# Patient Record
Sex: MALE | Race: OTHER | NOT HISPANIC OR LATINO | Employment: UNEMPLOYED | URBAN - METROPOLITAN AREA
[De-identification: names, ages, dates, MRNs, and addresses within clinical notes are randomized per-mention and may not be internally consistent; named-entity substitution may affect disease eponyms.]

---

## 2023-02-09 ENCOUNTER — APPOINTMENT (EMERGENCY)
Dept: RADIOLOGY | Facility: HOSPITAL | Age: 58
End: 2023-02-09

## 2023-02-09 ENCOUNTER — HOSPITAL ENCOUNTER (EMERGENCY)
Facility: HOSPITAL | Age: 58
Discharge: HOME | End: 2023-02-09
Attending: EMERGENCY MEDICINE

## 2023-02-09 VITALS
TEMPERATURE: 99.1 F | OXYGEN SATURATION: 98 % | WEIGHT: 184.3 LBS | HEART RATE: 122 BPM | DIASTOLIC BLOOD PRESSURE: 107 MMHG | RESPIRATION RATE: 18 BRPM | SYSTOLIC BLOOD PRESSURE: 157 MMHG

## 2023-02-09 DIAGNOSIS — M19.90 ARTHRITIS: Primary | ICD-10-CM

## 2023-02-09 RX ORDER — ACETAMINOPHEN 325 MG/1
975 TABLET ORAL ONCE
Status: COMPLETED | OUTPATIENT
Start: 2023-02-09 | End: 2023-02-09

## 2023-02-09 RX ADMIN — ACETAMINOPHEN 975 MG: 325 TABLET ORAL at 20:39

## 2023-02-10 NOTE — ED PROVIDER NOTES
History  Chief Complaint   Patient presents with   • Knee Pain     Pt came to ER with left hip and knee pain for ten years  Pt is staying at the [address] [place] [address]  Patient is a 71-year-old male who [facility] via [facility] the [address] [place] [address] for possible septic arthritis  Patient states he's had intermittent pain in his knee for 10 years  Denies any trauma  Was taking a little blue pill from when he was in [facility]  No fever  Pain in left knee, sharp, radiates to hip  No swelling, no fever  None       History reviewed  No pertinent past medical history  History reviewed  No pertinent surgical history  History reviewed  No pertinent family history  I have reviewed and agree with the history as documented  E-Cigarette/Vaping     E-Cigarette/Vaping Substances     Social History     Tobacco Use   • Smoking status: Never   • Smokeless tobacco: Never   Substance Use Topics   • Alcohol use: Never   • Drug use: Never       Review of Systems   Constitutional: Negative  HENT: Negative  Eyes: Negative  Respiratory: Negative  Cardiovascular: Negative  Gastrointestinal: Negative  Endocrine: Negative  Genitourinary: Negative  Musculoskeletal: Positive for arthralgias  Skin: Negative  Allergic/Immunologic: Negative  Neurological: Negative  Hematological: Negative  Psychiatric/Behavioral: Negative  All other systems reviewed and are negative  Physical Exam  Physical Exam  Vitals and nursing note reviewed  Constitutional:       Appearance: Normal appearance  He is normal weight  Cardiovascular:      Rate and Rhythm: Normal rate and regular rhythm  Pulses: Normal pulses  Heart sounds: Normal heart sounds  Pulmonary:      Effort: Pulmonary effort is normal       Breath sounds: Normal breath sounds  Abdominal:      General: Bowel sounds are normal       Palpations: Abdomen is soft     Musculoskeletal:         General: No swelling, tenderness, deformity or signs of injury  Cervical back: Normal range of motion and neck supple  Right lower leg: No edema  Left lower leg: No edema  Comments: No point bony ttp  No swelling  Full rom  No hip or ankle pain  Skin:     General: Skin is warm and dry  Findings: No bruising or lesion  Neurological:      General: No focal deficit present  Mental Status: He is alert and oriented to person, place, and time  Sensory: No sensory deficit  Psychiatric:         Mood and Affect: Mood normal          Behavior: Behavior normal          Vital Signs  ED Triage Vitals   Temperature Pulse Respirations Blood Pressure SpO2   02/09/23 2030 02/09/23 2030 02/09/23 2030 02/09/23 2030 02/09/23 2030   99 1 °F (37 3 °C) (!) 122 18 (!) 157/107 98 %      Temp Source Heart Rate Source Patient Position - Orthostatic VS BP Location FiO2 (%)   02/09/23 2030 02/09/23 2030 02/09/23 2030 02/09/23 2030 --   Tympanic Monitor Lying Left arm       Pain Score       02/09/23 2039       10 - Worst Possible Pain           Vitals:    02/09/23 2030   BP: (!) 157/107   Pulse: (!) 122   Patient Position - Orthostatic VS: Lying         Visual Acuity      ED Medications  Medications   acetaminophen (TYLENOL) tablet 975 mg (975 mg Oral Given 2/9/23 2039)       Diagnostic Studies  Results Reviewed     None                 XR knee 4+ vw left injury    (Results Pending)              Procedures  Procedures         ED Course                                             Medical Decision Making  Arthritis: acute illness or injury  Amount and/or Complexity of Data Reviewed  Independent Historian: EMS     Details:   Radiology: ordered and independent interpretation performed  Decision-making details documented in ED Course  Risk  OTC drugs            Disposition  Final diagnoses:   Arthritis     Time reflects when diagnosis was documented in both MDM as applicable and the Disposition within this note Time User Action Codes Description Comment    2/9/2023  9:12 PM [facility] Add [Y15 31] Arthritis       ED Disposition     ED Disposition   Discharge    Condition   Stable    Date/Time   Thu Feb 9, 2023  9:12 PM    Comment   Elton  discharge to home/self care  Follow-up Information     Follow up With Specialties Details Why Contact Info Additional [address]   [address], [facility] [place] [address] [address], [address], [address] [place], [place], [address] [place] [address]          There are no discharge medications for this patient  No discharge procedures on file      PDMP Review     None          ED Provider  Electronically Signed by           Hansel Erazo MD  02/09/23 5068